# Patient Record
Sex: FEMALE | Race: WHITE
[De-identification: names, ages, dates, MRNs, and addresses within clinical notes are randomized per-mention and may not be internally consistent; named-entity substitution may affect disease eponyms.]

---

## 2022-01-15 ENCOUNTER — HOSPITAL ENCOUNTER (EMERGENCY)
Dept: HOSPITAL 63 - ER | Age: 22
Discharge: HOME | End: 2022-01-15
Payer: OTHER GOVERNMENT

## 2022-01-15 VITALS — HEIGHT: 62 IN | BODY MASS INDEX: 24.34 KG/M2 | WEIGHT: 132.28 LBS

## 2022-01-15 VITALS — DIASTOLIC BLOOD PRESSURE: 81 MMHG | SYSTOLIC BLOOD PRESSURE: 124 MMHG

## 2022-01-15 DIAGNOSIS — K08.89: ICD-10-CM

## 2022-01-15 DIAGNOSIS — K02.9: Primary | ICD-10-CM

## 2022-01-15 PROCEDURE — 64400 NJX AA&/STRD TRIGEMINAL NRV: CPT

## 2022-01-15 PROCEDURE — 99284 EMERGENCY DEPT VISIT MOD MDM: CPT

## 2022-01-15 NOTE — PHYS DOC
Past History


Past Medical History:  No Pertinent History


Past Surgical History:  No Surgical History


Smoking:  Cigarettes, Less than 1pk/day


Alcohol Use:  Occasionally


Drug Use:  Marijuana





General Adult


EDM:


Chief Complaint:  Toothache





HPI:


HPI:


Patient is a 21-year-old female complaining of a chief complaint of tooth pain 

x4 days.  Patient states that the pain is localized to her right mandibular 

molars, pain radiates to the back of her skull and is a 8 out of 10 in severity.

 She also reports that Tylenol and ibuprofen have not helped.  Denies any 

associated symptoms. LMP [id number]





Review of Systems:


Review of Systems:


Constitutional: Denies fever or chills 


Eyes: Denies redness or eye pain 


HENT: Denies nasal congestion or sore throat


Respiratory: Denies cough or shortness of breath 


Cardiovascular: Denies chest pain or palpitations


GI: Denies abdominal pain, nausea, or vomiting


/GYN: Denies dysuria or hematuria, denies pregnancy


Musculoskeletal: Denies back pain or joint pain


Integument: Denies rash or skin lesions 


Neurologic: Denies headache, focal weakness or sensory changes





Complete systems were reviewed and found to be within normal limits, except as 

documented in this note.





Allergies:


Allergies:


NKDA





Physical Exam:


PE:


Constitutional: Well developed, well nourished, no acute distress, non-toxic 

appearance


HENT: Normocephalic, atraumatic, 


Eyes: conjunctiva normal, no discharge


Neck: Normal range of motion, no tenderness, supple


Lungs & Thorax:  No respiratory distress, equal chest rise and fall


Abdomen: Soft, no tenderness


Skin: Warm, dry, no erythema, no rash


Back: No tenderness, no CVA tenderness


Extremities: No tenderness, ROM intact, no edema


Neurologic: Alert and oriented X 3, no focal deficits noted


Psychologic: Affect normal, judgment normal





Current Patient Data:


Vital Signs:





                                   Vital Signs








  Date Time  Temp Pulse Resp B/P (MAP) Pulse Ox O2 Delivery O2 Flow Rate FiO2


 


1/15/22 18:26 97.8 99 18 124/81 (95) 99 Room Air  











EKG:


EKG:


[]





Radiology/Procedures:


Radiology/Procedures:


[]





Heart Score:


C/O Chest Pain:  N/A





Course & Med Decision Making:


Course & Med Decision Making


Patient presents with a chief complaint of tooth pain localized around her right

 mandibular molars. Possible dental cavity identified on right mandibular first 

molar. symptomatic treatment provided, empiric treatment initiated. 


Patient instructed to follow up with dentist as soon as possible.





Patient stable for discharge with outpatient follow-up with PCP. Discussed 

findings and plan with patient, who acknowledges understanding and agreement.





Dragon Disclaimer:


Dragon Disclaimer:


This electronic medical record was generated, in whole or in part, using a voice

 recognition dictation system.





Additional Procedures


Progress


Dental block-right inferior alveolar block





Verbal consent obtained. Time out performed. Hand hygiene utilized. Anesthesia 

obtained via a 25-gauge hypodermic needle with (3) mL's of lidocaine 2% with 

epinephrine. Patient tolerated procedure well and without difficulty.





Departure


Departure:


Impression:  


   Primary Impression:  


   Dentalgia


   Additional Impression:  


   Dental caries


Disposition:  01 HOME / SELF CARE / HOMELESS


Condition:  STABLE


Referrals:  


PCP,NO (PCP)


Patient Instructions:  Dental Caries, Toothache-Brief





Additional Instructions:  


Use over-the-counter ibuprofen and or Tylenol for pain or discomfort





Please follow closely with a dentist for definitive management.


Scripts


Chlorhexidine Gluconate (PERIDEX) 15 Ml Mouthwash


15 ML PO BID for Dental infection for 7 Days, #473 ML 0 Refills


   Prov: TYE MELTON DO         1/15/22 


Amoxicillin/Potassium Clav (AUGMENTIN 875-125 TABLET) 1 Each Tablet


1 TAB PO BID for Dental Caries for 7 Days, #14 TAB 0 Refills


   Prov: TYE MELTON DO         1/15/22











TYE MELTON DO             Mikael 15, 2022 18:53